# Patient Record
Sex: MALE | Race: WHITE | Employment: FULL TIME | URBAN - METROPOLITAN AREA
[De-identification: names, ages, dates, MRNs, and addresses within clinical notes are randomized per-mention and may not be internally consistent; named-entity substitution may affect disease eponyms.]

---

## 2022-05-20 ENCOUNTER — TELEPHONE (OUTPATIENT)
Dept: GASTROENTEROLOGY | Facility: CLINIC | Age: 57
End: 2022-05-20
Payer: COMMERCIAL

## 2022-05-20 VITALS — BODY MASS INDEX: 23.86 KG/M2 | HEIGHT: 73 IN | WEIGHT: 180 LBS

## 2022-05-20 DIAGNOSIS — Z86.010 HISTORY OF COLON POLYPS: ICD-10-CM

## 2022-05-20 DIAGNOSIS — Z12.11 SCREENING FOR COLON CANCER: ICD-10-CM

## 2022-05-20 DIAGNOSIS — Z85.038 PERSONAL HISTORY OF COLON CANCER: Primary | ICD-10-CM

## 2022-05-20 RX ORDER — QUETIAPINE FUMARATE 200 MG/1
200 TABLET, FILM COATED ORAL NIGHTLY
COMMUNITY
Start: 2022-05-11

## 2022-05-20 RX ORDER — EMPAGLIFLOZIN 10 MG/1
TABLET, FILM COATED ORAL
COMMUNITY
Start: 2022-05-14

## 2022-05-20 RX ORDER — GABAPENTIN 300 MG/1
300 CAPSULE ORAL 3 TIMES DAILY
COMMUNITY
Start: 2022-03-24

## 2022-05-20 RX ORDER — SEMAGLUTIDE 2.68 MG/ML
INJECTION, SOLUTION SUBCUTANEOUS
COMMUNITY
Start: 2022-05-12

## 2022-05-20 RX ORDER — SERTRALINE HYDROCHLORIDE 25 MG/1
25 TABLET, FILM COATED ORAL DAILY
COMMUNITY
Start: 2022-05-11

## 2022-05-20 RX ORDER — PRAZOSIN HYDROCHLORIDE 1 MG/1
1 CAPSULE ORAL NIGHTLY
COMMUNITY
Start: 2022-05-11

## 2022-05-20 RX ORDER — SEMAGLUTIDE 1.34 MG/ML
INJECTION, SOLUTION SUBCUTANEOUS
COMMUNITY
Start: 2022-02-13

## 2022-05-20 NOTE — TELEPHONE ENCOUNTER
Pt came into the clinic with letter he received for a recheck.  I scheduled his colonoscopy for 6/23/22 @ [facility] [place]. Pt is going to call later with the list of his medicines, he wasn't sure. Went over prep instructions. raven

## 2022-05-23 RX ORDER — SOD SULF/POT CHLORIDE/MAG SULF 1.479 G
12 TABLET ORAL DAILY
Qty: 24 TABLET | Refills: 0 | Status: SHIPPED | OUTPATIENT
Start: 2022-05-23

## 2022-06-15 ENCOUNTER — OUTSIDE PLACE OF SERVICE (OUTPATIENT)
Dept: GASTROENTEROLOGY | Facility: CLINIC | Age: 57
End: 2022-06-15
Payer: COMMERCIAL

## 2022-06-15 LAB — CRC RECOMMENDATION EXT: NORMAL

## 2022-06-15 PROCEDURE — 45385 PR COLONOSCOPY,REMV LESN,SNARE: ICD-10-PCS | Mod: 33,,, | Performed by: INTERNAL MEDICINE

## 2022-06-15 PROCEDURE — 45385 COLONOSCOPY W/LESION REMOVAL: CPT | Mod: 33,,, | Performed by: INTERNAL MEDICINE

## 2022-11-14 ENCOUNTER — DOCUMENTATION ONLY (OUTPATIENT)
Dept: GASTROENTEROLOGY | Facility: CLINIC | Age: 57
End: 2022-11-14
Payer: COMMERCIAL

## 2024-08-06 ENCOUNTER — TELEPHONE (OUTPATIENT)
Dept: GASTROENTEROLOGY | Facility: CLINIC | Age: 59
End: 2024-08-06
Payer: COMMERCIAL

## 2025-01-15 ENCOUNTER — TELEPHONE (OUTPATIENT)
Dept: GASTROENTEROLOGY | Facility: CLINIC | Age: 60
End: 2025-01-15
Payer: COMMERCIAL

## 2025-01-15 NOTE — TELEPHONE ENCOUNTER
----- Message from Aurealisscherise sent at 1/14/2025 11:35 AM CST -----  Contact: ADRIANNE WILL [[id number]]  ..Type:  Patient Requesting Call    Who Called:ADRIANNE WILL [[id number]]  Does the patient know what this is regarding?:questions   Would the patient rather a call back or a response via [facility]? call  Best Call Back Number:[phone]  Additional Information: 30 day pre-registation asking what to do and want to know if procedure is at [facility]

## 2025-01-15 NOTE — TELEPHONE ENCOUNTER
MIESHA informing pt that his procedure is at [facility] on the 2nd floor. Also informed the pt he could pre-register by calling [phone]. -OSVALDO

## 2025-01-23 ENCOUNTER — TELEPHONE (OUTPATIENT)
Dept: GASTROENTEROLOGY | Facility: CLINIC | Age: 60
End: 2025-01-23
Payer: COMMERCIAL

## 2025-01-23 VITALS — BODY MASS INDEX: 26.51 KG/M2 | HEIGHT: 73 IN | WEIGHT: 200 LBS

## 2025-01-23 DIAGNOSIS — Z85.038 PERSONAL HISTORY OF COLON CANCER: Primary | ICD-10-CM

## 2025-01-23 DIAGNOSIS — Z86.0100 HISTORY OF COLON POLYPS: ICD-10-CM

## 2025-01-23 NOTE — TELEPHONE ENCOUNTER
Called and left a detailed message that I was calling as a courtesy regarding up coming Colon with NBP on 1/27/25, Monday and wanted to verify that he has his paper prep instructions and meds. I reminded pt not to take Mounjaro 7 days before the procedure. I also mentioned that [facility] will call the day before (Fri) with the arrival time, GI Lab is located on the third floor, and to pre-register by tomorrow. raven

## 2025-01-23 NOTE — TELEPHONE ENCOUNTER
"[facility]  [address] [place] [address]  Phone: [phone]  Fax: [phone]    History & Physical         Provider: Dr. Perri Raphael    Patient Name: Pascual DOUGHERTY (age):1965  59 y.o.           Gender: male   Phone: [phone]     Referring Physician: Aleja Montero     Vital Signs:   Height - 6' 1"  Weight - 200 lb  BMI -  26.39    Plan: Colonoscopy @ [facility]    Encounter Diagnoses   Name Primary?    Personal history of colon cancer Yes    History of colon polyps            History:      Past Medical History:   Diagnosis Date    BMI 26.39 2024    CIDP (chronic inflammatory demyelinating polyneuropathy)     High cholesterol     History of colon cancer     s/p resection and chemo in     History of liver cancer 2010    s/p right heaptic lobectomy and chemo    Hx of internal hemorrhoids     Personal history of colonic polyps     PTSD (post-traumatic stress disorder)     Thyroid disease     Type 2 diabetes mellitus without complications       Past Surgical History:   Procedure Laterality Date    APPENDECTOMY      CHOLECYSTECTOMY      COLECTOMY      anastomosis at 20 cm    DISC FUSION      LIVER LOBECTOMY Right     NECK SURGERY      PORTACATH PLACEMENT      SINUS SURGERY      VENTRAL HERNIA REPAIR      ventral  Dr. Sofia      Medication List with Changes/Refills   Current Medications    DULOXETINE (CYMBALTA) 30 MG CAPSULE    Take 30 mg by mouth.    GABAPENTIN (NEURONTIN) 300 MG CAPSULE    Take 300 mg by mouth 3 (three) times daily.    LEVOTHYROXINE (SYNTHROID) 50 MCG TABLET    Take 50 mcg by mouth every morning.    MOUNJARO 5 MG/0.5 ML PNIJ    INJECT 5 MG UNDER THE SKIN ONCE A WEEK    SOD SULF-POT CHLORIDE-MAG SULF (SUTAB) 1.479-0.188- 0.225 GRAM TABLET    Take according to package instructions with indicated amount of water. No breakfast day before test. " May substitute with Suprep, Clenpiq, Plenvu, Moviprep or GoLytely based on Rx plan and patient preference.      Review of patient's allergies indicates:   Allergen Reactions    Morphine       Family History   Problem Relation Name Age of Onset    No Known Problems Mother      No Known Problems Father      Colon polyps Brother      Crohn's disease Neg Hx      Liver disease Neg Hx        Social History     Tobacco Use    Smoking status: Never    Smokeless tobacco: Never   Substance Use Topics    Alcohol use: Never    Drug use: Never        Physical Examination:     General Appearance:___________________________  HEENT: _____________________________________  Abdomen:____________________________________  Heart:________________________________________  Lungs:_______________________________________  Extremities:___________________________________  Skin:_________________________________________  Endocrine:____________________________________  Genitourinary:_________________________________  Neurological:__________________________________      Patient has been evaluated immediately prior to sedation and is medically cleared for endoscopy with IVCS as an ASA class: ______      Physician Signature: _________________________       Date: ________  Time: ________

## 2025-01-28 ENCOUNTER — TELEPHONE (OUTPATIENT)
Dept: GASTROENTEROLOGY | Facility: CLINIC | Age: 60
End: 2025-01-28
Payer: COMMERCIAL

## 2025-01-28 NOTE — TELEPHONE ENCOUNTER
----- Message from TAKO sent at 1/27/2025 11:57 AM CST -----  Contact: ADRIANNE WILL [[id number]]  .Type:  Patient Requesting Call    Who Called:ADRIANNE WILL [[id number]]  Does the patient know what this is regarding?:rescheduling 1/27 appt  Would the patient rather a call back or a response via [facility]? call  Best Call Back Number:.[phone] (home)     Additional Information:

## 2025-01-29 NOTE — TELEPHONE ENCOUNTER
----- Message from Yolie sent at 1/28/2025  2:35 PM CST -----  Type:  Patient Returning Call    Who Called: patient   Who Left Message for Patient: nurse  Does the patient know what this is regarding?: scheduling   Would the patient rather a call back or a response via [facility]? Call back  Best Call Back Number: [phone]    Additional Information:

## 2025-01-29 NOTE — TELEPHONE ENCOUNTER
Pt stopped by the clinic to get reschedule for colonoscopy. R/s to 6/30/25 -Monday. Pt would like to have his procedure earlier if possible since he has a hx of colon cancer.  Pt stated his wife is still in the hospital and would like to get her out first, so anytime after next month. I told him that I would send a message to NBFLAQUITA. raven

## 2025-03-13 ENCOUNTER — TELEPHONE (OUTPATIENT)
Dept: GASTROENTEROLOGY | Facility: CLINIC | Age: 60
End: 2025-03-13
Payer: COMMERCIAL

## 2025-03-13 VITALS — WEIGHT: 200 LBS | HEIGHT: 73 IN | BODY MASS INDEX: 26.51 KG/M2

## 2025-03-13 DIAGNOSIS — Z85.038 PERSONAL HISTORY OF COLON CANCER: Primary | ICD-10-CM

## 2025-03-13 DIAGNOSIS — Z86.0100 HISTORY OF COLON POLYPS: ICD-10-CM

## 2025-03-13 NOTE — TELEPHONE ENCOUNTER
Called and left a detailed message that I was calling as a courtesy regarding up coming Colon with NBP on 3/21/25, Friday and wanted to verify that he has his paper prep instructions and meds. I reminded pt not to take Mounjaro 7 days before the procedure.  I also mentioned that [facility] will call the day before (THURS) with the arrival time, GI Lab is located on the third floor, and to pre-register before next Thurs. raven

## 2025-03-13 NOTE — TELEPHONE ENCOUNTER
"  [facility]        Provider: Perri Raphael MD    Patient Name: Pascual DOUGHERTY (age):1965  59 y.o.           Gender: male   Phone: [phone]     Referring Physician: Aleja Montero     Vital Signs:   Height - 6' 1"  Weight - 200 lb  BMI -  26.39    Plan: Colonoscopy @ [facility]    Encounter Diagnoses   Name Primary?    Personal history of colon cancer Yes    History of colon polyps            History:      Past Medical History:   Diagnosis Date    BMI 26.39 2024    CIDP (chronic inflammatory demyelinating polyneuropathy)     High cholesterol     History of colon cancer     s/p resection and chemo in     History of liver cancer 2010    s/p right heaptic lobectomy and chemo    Hx of internal hemorrhoids     Personal history of colonic polyps     PTSD (post-traumatic stress disorder)     Thyroid disease     Type 2 diabetes mellitus without complications       Past Surgical History:   Procedure Laterality Date    APPENDECTOMY      CHOLECYSTECTOMY      COLECTOMY      anastomosis at 20 cm    DISC FUSION      LIVER LOBECTOMY Right     NECK SURGERY      PORTACATH PLACEMENT      SINUS SURGERY      VENTRAL HERNIA REPAIR      ventral  Dr. Sofia      Medication List with Changes/Refills   Current Medications    DULOXETINE (CYMBALTA) 30 MG CAPSULE    Take 30 mg by mouth.    GABAPENTIN (NEURONTIN) 300 MG CAPSULE    Take 300 mg by mouth 3 (three) times daily.    LEVOTHYROXINE (SYNTHROID) 50 MCG TABLET    Take 50 mcg by mouth every morning.    MOUNJARO 5 MG/0.5 ML PNIJ    INJECT 5 MG UNDER THE SKIN ONCE A WEEK    SOD SULF-POT CHLORIDE-MAG SULF (SUTAB) 1.479-0.188- 0.225 GRAM TABLET    Take according to package instructions with indicated amount of water. No breakfast day before test. May substitute with Suprep, Clenpiq, Plenvu, Moviprep or GoLytely based on Rx plan and patient preference.      Review of " patient's allergies indicates:   Allergen Reactions    Morphine       Family History   Problem Relation Name Age of Onset    No Known Problems Mother      No Known Problems Father      Colon polyps Brother      Crohn's disease Neg Hx      Liver disease Neg Hx        Social History[1]                          [1]   Social History  Tobacco Use    Smoking status: Never    Smokeless tobacco: Never   Substance Use Topics    Alcohol use: Never    Drug use: Never

## 2025-03-21 ENCOUNTER — OUTSIDE PLACE OF SERVICE (OUTPATIENT)
Dept: GASTROENTEROLOGY | Facility: CLINIC | Age: 60
End: 2025-03-21

## 2025-03-21 LAB — CRC RECOMMENDATION EXT: NORMAL

## 2025-03-21 PROCEDURE — 45385 COLONOSCOPY W/LESION REMOVAL: CPT | Mod: 33,,, | Performed by: INTERNAL MEDICINE

## 2025-03-30 ENCOUNTER — RESULTS FOLLOW-UP (OUTPATIENT)
Dept: GASTROENTEROLOGY | Facility: CLINIC | Age: 60
End: 2025-03-30
Payer: COMMERCIAL

## 2025-03-30 NOTE — TELEPHONE ENCOUNTER
1 TA, repeat colonoscopy in 3 years.     Notify patient. Confirm recall tab in appointment desk is up-to-date (update if needed). Send colonoscopy and path reports to Dr. De La Torre.  ARCADIO

## 2025-04-16 ENCOUNTER — DOCUMENTATION ONLY (OUTPATIENT)
Dept: GASTROENTEROLOGY | Facility: CLINIC | Age: 60
End: 2025-04-16
Payer: COMMERCIAL